# Patient Record
Sex: MALE | Race: BLACK OR AFRICAN AMERICAN
[De-identification: names, ages, dates, MRNs, and addresses within clinical notes are randomized per-mention and may not be internally consistent; named-entity substitution may affect disease eponyms.]

---

## 2021-07-21 PROBLEM — Z00.00 ENCOUNTER FOR PREVENTIVE HEALTH EXAMINATION: Status: ACTIVE | Noted: 2021-07-21

## 2021-07-24 ENCOUNTER — TRANSCRIPTION ENCOUNTER (OUTPATIENT)
Age: 26
End: 2021-07-24

## 2021-07-26 ENCOUNTER — NON-APPOINTMENT (OUTPATIENT)
Age: 26
End: 2021-07-26

## 2021-07-26 ENCOUNTER — APPOINTMENT (OUTPATIENT)
Dept: ORTHOPEDIC SURGERY | Facility: CLINIC | Age: 26
End: 2021-07-26
Payer: COMMERCIAL

## 2021-07-26 PROCEDURE — 73552 X-RAY EXAM OF FEMUR 2/>: CPT | Mod: LT

## 2021-07-26 PROCEDURE — 99024 POSTOP FOLLOW-UP VISIT: CPT

## 2021-07-26 NOTE — HISTORY OF PRESENT ILLNESS
[de-identified] : s/p ORIF of the left hip [de-identified] : The patient is a 26 year male who returns for the 1st postoperative visit after undergoing ORIF of the left hip at [facility]. The surgery was on 07/03/2021. The patient is recovering at home. He reports mild postoperative pain. He is not currently receiving at home therapy. He has been taking Tylenol and Ibuprofen 800mg for pain. [de-identified] : Patient is WDWN, alert, and in no acute distress. Breathing is unlabored. He is grossly oriented to person, place, and time. \par \par He is accompanied by his mother today.\par \par Right Lower Extremity:\par Dissolvable sutures are in place. \par Dressing was removed.\par Incision is healing. No signs of postoperative infection. Normal amount of postoperative edema and tenderness. \par Knee ROM: Flexion: 90\par Hip ROM: Flexion: 90 [de-identified] : AP and lateral views of the left hip and pelvis were obtained and revealed a subtrochanteric femur fracture stabilized by titanium tam and screws. Fracture is still healing. \par 		  [de-identified] : Dressing removed.\par Advised to massage the scar with Vitamin E oil.\par Advised to take Calcium Citrate, Vitamin D3 and Vitamin C.\par Instructed on at home ROM and strengthening exercises of the ankle and lower extremity such as ankle pumps and leg lifts.\par Encouraged use of a stationary bike.\par The patient wishes to proceed with physical therapy of the left hip (WBAT, ROM and gentle strengthening). A script was given.	\par NSAIDs prn.\par Follow up in 6 weeks.

## 2021-07-26 NOTE — END OF VISIT
[FreeTextEntry3] : All medical record entries made by the Scribe were at my,  Dr. Peter Mendez MD., direction and personally dictated by me on 07/26/2021. I have personally reviewed the chart and agree that the record accurately reflects my personal performance of the history, physical exam, assessment and plan.\par

## 2021-07-26 NOTE — ADDENDUM
[FreeTextEntry1] : I, Marti Skaggs wrote this note acting as a scribe for Dr. Peter Mendez on Jul 26, 2021.\par \par

## 2021-07-26 NOTE — RETURN TO WORK/SCHOOL
[FreeTextEntry1] : Mr. MERRILL GONZALES was seen in the office today on 07/26/2021 and evaluated by me for an Orthopedic visit. Please be advised that he has been unable to work since the date of the surgery, 07/03/21. At this time will be out of work until further notice.\par \par Sincerely, \par \par Peter Mendez on Jul 26, 2021

## 2021-09-13 ENCOUNTER — APPOINTMENT (OUTPATIENT)
Dept: ORTHOPEDIC SURGERY | Facility: CLINIC | Age: 26
End: 2021-09-13
Payer: COMMERCIAL

## 2021-09-13 DIAGNOSIS — S62.111A DISPLACED FRACTURE OF TRIQUETRUM [CUNEIFORM] BONE, RIGHT WRIST, INITIAL ENCOUNTER FOR CLOSED FRACTURE: ICD-10-CM

## 2021-09-13 PROCEDURE — 73110 X-RAY EXAM OF WRIST: CPT | Mod: RT

## 2021-09-13 PROCEDURE — 73502 X-RAY EXAM HIP UNI 2-3 VIEWS: CPT | Mod: LT

## 2021-09-13 PROCEDURE — 99024 POSTOP FOLLOW-UP VISIT: CPT

## 2021-09-13 RX ORDER — TRAMADOL HYDROCHLORIDE 50 MG/1
50 TABLET, COATED ORAL
Qty: 20 | Refills: 0 | Status: ACTIVE | COMMUNITY
Start: 2021-07-09

## 2021-09-13 RX ORDER — IBUPROFEN 800 MG/1
800 TABLET, FILM COATED ORAL
Qty: 42 | Refills: 0 | Status: ACTIVE | COMMUNITY
Start: 2021-07-07

## 2021-09-13 NOTE — ADDENDUM
[FreeTextEntry1] : I, Marti Skaggs wrote this note acting as a scribe for Dr. Peter Mendez on Sep 13, 2021.
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
## 2021-09-13 NOTE — END OF VISIT
[FreeTextEntry3] : All medical record entries made by the Scribe were at my,  Dr. Peter Mendez MD., direction and personally dictated by me on 09/13/2021. I have personally reviewed the chart and agree that the record accurately reflects my personal performance of the history, physical exam, assessment and plan.

## 2021-09-23 ENCOUNTER — NON-APPOINTMENT (OUTPATIENT)
Age: 26
End: 2021-09-23

## 2021-12-16 ENCOUNTER — APPOINTMENT (OUTPATIENT)
Dept: ORTHOPEDIC SURGERY | Facility: CLINIC | Age: 26
End: 2021-12-16
Payer: COMMERCIAL

## 2021-12-16 PROCEDURE — 99213 OFFICE O/P EST LOW 20 MIN: CPT

## 2021-12-16 PROCEDURE — 73552 X-RAY EXAM OF FEMUR 2/>: CPT | Mod: LT

## 2021-12-16 PROCEDURE — 99072 ADDL SUPL MATRL&STAF TM PHE: CPT

## 2021-12-16 RX ORDER — IBUPROFEN 800 MG/1
800 TABLET, FILM COATED ORAL
Qty: 60 | Refills: 0 | Status: ACTIVE | COMMUNITY
Start: 2021-12-16 | End: 1900-01-01

## 2021-12-17 NOTE — DISCUSSION/SUMMARY
[de-identified] : The underlying pathophysiology was reviewed with the patient. XR films were reviewed with the patient. Discussed at length the nature of the patient’s condition. \par \par Xrays were reviewed and I discussed with the patient and his mother that there is evidence of healing taking place. Furthermore I did tell them if the fracture was not healing, the hardware would have failed but it has remained in good position up to this point.\par Rx: Ibuprofen 800mg, BID\par The patient wishes to proceed with physical therapy of the left hip.  A script was given.\par \par All questions answered, understanding verbalized. Patient in agreement with plan of care. Follow up in 3 months for repeat xrays.

## 2021-12-17 NOTE — END OF VISIT
[FreeTextEntry3] : All medical record entries made by the Scribe were at my,  Dr. Peter Mendez MD., direction and personally dictated by me on 12/16/2021. I have personally reviewed the chart and agree that the record accurately reflects my personal performance of the history, physical exam, assessment and plan.
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
## 2021-12-17 NOTE — PHYSICAL EXAM
[de-identified] : Patient is WDWN, alert, and in no acute distress. Breathing is unlabored. He is grossly oriented to person, place, and time. \par \par He is accompanied by his mother today.\par \par Right Lower Extremity:\par \par Incision is healed No signs of postoperative infection. Normal amount of postoperative edema and tenderness. \par Knee ROM: Flexion: 90\par Hip ROM: Flexion: 90.  [de-identified] : AP and lateral views of the left hip and pelvis were obtained and revealed a subtrochanteric femur fracture stabilized by titanium tam and screws. Fracture is still healing. Hardware in good position

## 2021-12-17 NOTE — ADDENDUM
[FreeTextEntry1] : I, Marti Skaggs wrote this note acting as a scribe for Dr. Peter Mendez on Dec 16, 2021.

## 2021-12-17 NOTE — RETURN TO WORK/SCHOOL
[FreeTextEntry1] : Mr. MERRILL GONZALES was seen in the office today on 12/16/2021 and evaluated by me for an Orthopedic visit. Please be advised that he will remain out of work until further notice. \par \par Sincerely,\par \par Dr. Peter Mendez M.D. on 12/16/2021.

## 2021-12-17 NOTE — HISTORY OF PRESENT ILLNESS
[de-identified] : The patient is a 26 year male who returns for a follow up visit after undergoing ORIF of the left hip at [facility]. The surgery was on 07/03/2021. He returns on 12/16/21 and notes to be improved since his last visit. He presents ambulating with a cane for assistance. He is currently in physical therapy.

## 2022-03-17 ENCOUNTER — APPOINTMENT (OUTPATIENT)
Dept: ORTHOPEDIC SURGERY | Facility: CLINIC | Age: 27
End: 2022-03-17

## 2022-04-07 ENCOUNTER — APPOINTMENT (OUTPATIENT)
Dept: ORTHOPEDIC SURGERY | Facility: CLINIC | Age: 27
End: 2022-04-07
Payer: COMMERCIAL

## 2022-04-07 PROCEDURE — 99213 OFFICE O/P EST LOW 20 MIN: CPT

## 2022-04-07 PROCEDURE — 99072 ADDL SUPL MATRL&STAF TM PHE: CPT

## 2022-04-07 PROCEDURE — 73502 X-RAY EXAM HIP UNI 2-3 VIEWS: CPT | Mod: LT

## 2022-04-08 NOTE — END OF VISIT
[FreeTextEntry3] : All medical record entries made by the Scribe were at my,  Dr. Peter Mendez MD., direction and personally dictated by me on 04/07/2022. I have personally reviewed the chart and agree that the record accurately reflects my personal performance of the history, physical exam, assessment and plan.

## 2022-04-08 NOTE — HISTORY OF PRESENT ILLNESS
[de-identified] : The patient is a 26 year male who returns for a follow up visit after undergoing ORIF of the left hip at [facility]. The surgery was on 07/03/2021. He returns on 4/7/22 and reports to have continued with physical therapy. He takes Ibuprofen for pain as needed.

## 2022-04-08 NOTE — ADDENDUM
[FreeTextEntry1] : I, Marti Skaggs wrote this note acting as a scribe for Dr. Peter Mendez on Apr 07, 2022.

## 2022-04-08 NOTE — PHYSICAL EXAM
[de-identified] : Patient is WDWN, alert, and in no acute distress. Breathing is unlabored. He is grossly oriented to person, place, and time. \par \par He is accompanied by his mother today.\par \par Right Lower Extremity:\par \par Incision is healed No signs of postoperative infection. Normal amount of postoperative edema and tenderness. \par Knee ROM: Flexion: 90\par Hip ROM: Flexion: 90.  [de-identified] : AP and lateral views of the left hip and pelvis were obtained and revealed a subtrochanteric femur fracture stabilized by titanium tam and screws. Fracture is healing. Hardware in good position

## 2022-04-08 NOTE — DISCUSSION/SUMMARY
[de-identified] : The underlying pathophysiology was reviewed with the patient. XR films were reviewed with the patient. Discussed at length the nature of the patient’s condition. \par \par He may advance his activities as tolerated, without restrictions..\par He will continue with physical therapy for ROM and strengthening. \par He was provided with an updated note to remain out of work until further notice. \par \par RX: Ibuprofen 600mg, BID (30 tablets)\par \par All questions answered, understanding verbalized. Patient in agreement with plan of care. Follow up in 3 months for repeat xrays.

## 2022-04-08 NOTE — RETURN TO WORK/SCHOOL
[FreeTextEntry1] : Mr. MERRILL GONZALES was seen in the office today on 04/07/2022 and evaluated by me for an Orthopedic visit. Please be advised that he will remain out of work until further notice. \par \par Sincerely,\par \par Dr. Peter Mendez M.D. on 04/07/2022.

## 2022-05-19 ENCOUNTER — RX RENEWAL (OUTPATIENT)
Age: 27
End: 2022-05-19

## 2022-05-19 RX ORDER — IBUPROFEN 800 MG/1
800 TABLET, FILM COATED ORAL
Qty: 60 | Refills: 0 | Status: ACTIVE | COMMUNITY
Start: 2022-04-07 | End: 1900-01-01

## 2022-07-07 ENCOUNTER — APPOINTMENT (OUTPATIENT)
Dept: ORTHOPEDIC SURGERY | Facility: CLINIC | Age: 27
End: 2022-07-07
Payer: COMMERCIAL

## 2022-07-07 PROCEDURE — 99213 OFFICE O/P EST LOW 20 MIN: CPT

## 2022-07-07 PROCEDURE — 73552 X-RAY EXAM OF FEMUR 2/>: CPT | Mod: LT

## 2022-07-07 PROCEDURE — 99072 ADDL SUPL MATRL&STAF TM PHE: CPT

## 2022-07-08 NOTE — PHYSICAL EXAM
[de-identified] : Patient is WDWN, alert, and in no acute distress. Breathing is unlabored. He is grossly oriented to person, place, and time. \par \par He is accompanied by his mother today.\par \par Right Lower Extremity:\par \par Incision is healed No signs of postoperative infection. Normal amount of postoperative edema and tenderness. \par Knee ROM: Flexion: 90\par Hip ROM: Flexion: 90.  [de-identified] : AP and lateral views of the left hip and pelvis were obtained and revealed a subtrochanteric femur fracture stabilized by titanium tam and screws. Fracture is healing. Hardware in good position

## 2022-07-08 NOTE — HISTORY OF PRESENT ILLNESS
[de-identified] : The patient is a 26 year male who returns for a follow up visit after undergoing ORIF of the left hip at [facility]. The surgery was on 07/03/2021. He returns on 7/7/22 and reports to have continued with physical therapy. He takes Ibuprofen for pain as needed.

## 2022-07-08 NOTE — DISCUSSION/SUMMARY
[de-identified] : The underlying pathophysiology was reviewed with the patient. XR films were reviewed with the patient. Discussed at length the nature of the patient’s condition. \par \par He may advance his activities as tolerated, without restrictions..\par He will continue with home exercises for ROM and strengthening. \par He was provided with an updated note to remain out of work until further notice.\par Discussed potential removal of hardware. Patient will  contact the office when he is ready to schedule.\par \par All questions answered, understanding verbalized. Patient in agreement with plan of care. Follow up in 3 months for repeat xrays.

## 2022-10-13 ENCOUNTER — APPOINTMENT (OUTPATIENT)
Dept: ORTHOPEDIC SURGERY | Facility: CLINIC | Age: 27
End: 2022-10-13

## 2023-02-14 ENCOUNTER — NON-APPOINTMENT (OUTPATIENT)
Age: 28
End: 2023-02-14

## 2023-02-16 ENCOUNTER — APPOINTMENT (OUTPATIENT)
Dept: ORTHOPEDIC SURGERY | Facility: CLINIC | Age: 28
End: 2023-02-16
Payer: COMMERCIAL

## 2023-02-16 VITALS — BODY MASS INDEX: 22.53 KG/M2 | WEIGHT: 170 LBS | HEIGHT: 73 IN

## 2023-02-16 DIAGNOSIS — S72.22XD DISPLACED SUBTROCHANTERIC FRACTURE OF LEFT FEMUR, SUBSEQUENT ENCOUNTER FOR CLOSED FRACTURE WITH ROUTINE HEALING: ICD-10-CM

## 2023-02-16 PROCEDURE — 99213 OFFICE O/P EST LOW 20 MIN: CPT

## 2023-02-16 PROCEDURE — 99072 ADDL SUPL MATRL&STAF TM PHE: CPT

## 2023-02-16 PROCEDURE — 73552 X-RAY EXAM OF FEMUR 2/>: CPT | Mod: LT

## 2023-02-16 NOTE — END OF VISIT
[FreeTextEntry3] : All medical record entries made by the Scribe were at my,  Dr. Peter Mendez MD., direction and personally dictated by me on 02/16/2023. I have personally reviewed the chart and agree that the record accurately reflects my personal performance of the history, physical exam, assessment and plan.

## 2023-02-16 NOTE — ADDENDUM
[FreeTextEntry1] : I, Marti Skaggs wrote this note acting as a scribe for Dr. Peter Mendez on Feb 16, 2023.

## 2023-02-16 NOTE — PHYSICAL EXAM
[de-identified] : Patient is WDWN, alert, and in no acute distress. Breathing is unlabored. He is grossly oriented to person, place, and time.\par \par He is accompanied by his mother today.\par \par Right Lower Extremity:\par Incision is healed No signs of postoperative infection. \par Normal amount of postoperative edema and tenderness. \par Knee ROM: Flexion: 90\par Hip ROM: Flexion: 90.  [de-identified] : AP and lateral views of the left hip and pelvis were obtained and revealed a subtrochanteric femur fracture stabilized by titanium tam and screws. Fracture is fully healed. Hardware in good position.

## 2023-02-16 NOTE — HISTORY OF PRESENT ILLNESS
[de-identified] : The patient is a 26 year male who returns for a follow up visit after undergoing ORIF of the left hip at [facility]. The surgery was on 07/03/2021. He returns on 2/16/23 and notes diffuse pain throughout the lower extremity but states the pain occurs at different times. He is however not able to run.

## 2023-02-16 NOTE — DISCUSSION/SUMMARY
[de-identified] : The underlying pathophysiology was reviewed with the patient. XR films were reviewed with the patient. Discussed at length the nature of the patient’s condition. \par \par He may advance his activities as tolerated, without restrictions. He will continue with home exercises for ROM and strengthening. Additionally, we again discussed the potential hardware removal, however I would only recommend removal of the distal locking bolts and that is if he has pain and discomfort in that region. I would however not recommended removal of the tam. He did state he has discomfort and pain around his knee and potentially would be interested in removal of hardware of the distal locking bolts. He stated however he would like to further think about this prior to proceeding. He and his mother were provided with the information of our surgical scheduler and will call the office if they opt to proceed. \par \par All questions answered, understanding verbalized. Patient in agreement with plan of care. Follow up as needed.